# Patient Record
Sex: MALE | Race: WHITE | Employment: UNEMPLOYED | URBAN - METROPOLITAN AREA
[De-identification: names, ages, dates, MRNs, and addresses within clinical notes are randomized per-mention and may not be internally consistent; named-entity substitution may affect disease eponyms.]

---

## 2020-06-06 ENCOUNTER — APPOINTMENT (OUTPATIENT)
Dept: CT IMAGING | Age: 50
End: 2020-06-06
Payer: COMMERCIAL

## 2020-06-06 ENCOUNTER — HOSPITAL ENCOUNTER (OUTPATIENT)
Age: 50
Setting detail: OBSERVATION
Discharge: HOME | End: 2020-06-07
Attending: EMERGENCY MEDICINE | Admitting: HOSPITALIST
Payer: COMMERCIAL

## 2020-06-06 PROBLEM — R94.31 ABNORMAL EKG: Status: ACTIVE | Noted: 2020-06-06

## 2020-06-06 PROBLEM — R11.2 INTRACTABLE NAUSEA AND VOMITING: Status: ACTIVE | Noted: 2020-06-06

## 2020-06-06 PROBLEM — R10.9 ABDOMINAL PAIN: Status: ACTIVE | Noted: 2020-06-06

## 2020-06-06 LAB
ALBUMIN SERPL-MCNC: 4.4 G/DL (ref 3.5–5.2)
ALBUMIN SERPL-MCNC: 4.8 G/DL (ref 3.5–5.2)
ALP BLD-CCNC: 108 U/L (ref 40–129)
ALP BLD-CCNC: 118 U/L (ref 40–129)
ALT SERPL-CCNC: 14 U/L (ref 0–40)
ALT SERPL-CCNC: 16 U/L (ref 0–40)
AMPHETAMINE SCREEN, URINE: NOT DETECTED
ANION GAP SERPL CALCULATED.3IONS-SCNC: 18 MMOL/L (ref 7–16)
AST SERPL-CCNC: 15 U/L (ref 0–39)
AST SERPL-CCNC: 16 U/L (ref 0–39)
BACTERIA: ABNORMAL /HPF
BARBITURATE SCREEN URINE: NOT DETECTED
BASOPHILS ABSOLUTE: 0.03 E9/L (ref 0–0.2)
BASOPHILS RELATIVE PERCENT: 0.3 % (ref 0–2)
BENZODIAZEPINE SCREEN, URINE: NOT DETECTED
BILIRUB SERPL-MCNC: 0.6 MG/DL (ref 0–1.2)
BILIRUB SERPL-MCNC: 0.7 MG/DL (ref 0–1.2)
BILIRUBIN DIRECT: <0.2 MG/DL (ref 0–0.3)
BILIRUBIN URINE: ABNORMAL
BILIRUBIN, INDIRECT: NORMAL MG/DL (ref 0–1)
BLOOD, URINE: NEGATIVE
BUN BLDV-MCNC: 19 MG/DL (ref 6–20)
CALCIUM SERPL-MCNC: 10.1 MG/DL (ref 8.6–10.2)
CANNABINOID SCREEN URINE: NOT DETECTED
CHLORIDE BLD-SCNC: 94 MMOL/L (ref 98–107)
CLARITY: CLEAR
CO2: 27 MMOL/L (ref 22–29)
COCAINE METABOLITE SCREEN URINE: NOT DETECTED
COLOR: YELLOW
CREAT SERPL-MCNC: 1 MG/DL (ref 0.7–1.2)
EOSINOPHILS ABSOLUTE: 0.03 E9/L (ref 0.05–0.5)
EOSINOPHILS RELATIVE PERCENT: 0.3 % (ref 0–6)
EPITHELIAL CELLS, UA: ABNORMAL /HPF
ETHANOL: <10 MG/DL (ref 0–0.08)
FENTANYL SCREEN, URINE: NOT DETECTED
GFR AFRICAN AMERICAN: >60
GFR NON-AFRICAN AMERICAN: >60 ML/MIN/1.73
GLUCOSE BLD-MCNC: 137 MG/DL (ref 74–99)
GLUCOSE URINE: NEGATIVE MG/DL
HCT VFR BLD CALC: 44 % (ref 37–54)
HEMOGLOBIN: 15.2 G/DL (ref 12.5–16.5)
IMMATURE GRANULOCYTES #: 0.06 E9/L
IMMATURE GRANULOCYTES %: 0.6 % (ref 0–5)
KETONES, URINE: 40 MG/DL
LACTIC ACID: 1.2 MMOL/L (ref 0.5–2.2)
LEUKOCYTE ESTERASE, URINE: NEGATIVE
LIPASE: 14 U/L (ref 13–60)
LIPASE: 14 U/L (ref 13–60)
LYMPHOCYTES ABSOLUTE: 1.37 E9/L (ref 1.5–4)
LYMPHOCYTES RELATIVE PERCENT: 12.9 % (ref 20–42)
Lab: NORMAL
MCH RBC QN AUTO: 28.7 PG (ref 26–35)
MCHC RBC AUTO-ENTMCNC: 34.5 % (ref 32–34.5)
MCV RBC AUTO: 83 FL (ref 80–99.9)
METHADONE SCREEN, URINE: NOT DETECTED
MONOCYTES ABSOLUTE: 0.48 E9/L (ref 0.1–0.95)
MONOCYTES RELATIVE PERCENT: 4.5 % (ref 2–12)
NEUTROPHILS ABSOLUTE: 8.62 E9/L (ref 1.8–7.3)
NEUTROPHILS RELATIVE PERCENT: 81.4 % (ref 43–80)
NITRITE, URINE: NEGATIVE
OPIATE SCREEN URINE: NOT DETECTED
OXYCODONE URINE: NOT DETECTED
PDW BLD-RTO: 14.3 FL (ref 11.5–15)
PH UA: 8 (ref 5–9)
PHENCYCLIDINE SCREEN URINE: NOT DETECTED
PLATELET # BLD: 262 E9/L (ref 130–450)
PMV BLD AUTO: 10.6 FL (ref 7–12)
POTASSIUM SERPL-SCNC: 3.9 MMOL/L (ref 3.5–5)
PROTEIN UA: 30 MG/DL
RBC # BLD: 5.3 E12/L (ref 3.8–5.8)
RBC UA: ABNORMAL /HPF (ref 0–2)
SARS-COV-2, NAAT: ABNORMAL
SARS-COV-2, NAAT: NOT DETECTED
SODIUM BLD-SCNC: 139 MMOL/L (ref 132–146)
SPECIFIC GRAVITY UA: 1.01 (ref 1–1.03)
TOTAL PROTEIN: 7.4 G/DL (ref 6.4–8.3)
TOTAL PROTEIN: 7.7 G/DL (ref 6.4–8.3)
TROPONIN: <0.01 NG/ML (ref 0–0.03)
UROBILINOGEN, URINE: 0.2 E.U./DL
WBC # BLD: 10.6 E9/L (ref 4.5–11.5)
WBC UA: ABNORMAL /HPF (ref 0–5)

## 2020-06-06 PROCEDURE — 96376 TX/PRO/DX INJ SAME DRUG ADON: CPT

## 2020-06-06 PROCEDURE — 85025 COMPLETE CBC W/AUTO DIFF WBC: CPT

## 2020-06-06 PROCEDURE — 36415 COLL VENOUS BLD VENIPUNCTURE: CPT

## 2020-06-06 PROCEDURE — 6370000000 HC RX 637 (ALT 250 FOR IP): Performed by: INTERNAL MEDICINE

## 2020-06-06 PROCEDURE — G0378 HOSPITAL OBSERVATION PER HR: HCPCS

## 2020-06-06 PROCEDURE — 96372 THER/PROPH/DIAG INJ SC/IM: CPT

## 2020-06-06 PROCEDURE — 96374 THER/PROPH/DIAG INJ IV PUSH: CPT

## 2020-06-06 PROCEDURE — 81001 URINALYSIS AUTO W/SCOPE: CPT

## 2020-06-06 PROCEDURE — 83690 ASSAY OF LIPASE: CPT

## 2020-06-06 PROCEDURE — 96375 TX/PRO/DX INJ NEW DRUG ADDON: CPT

## 2020-06-06 PROCEDURE — 6360000002 HC RX W HCPCS: Performed by: HOSPITALIST

## 2020-06-06 PROCEDURE — 93005 ELECTROCARDIOGRAM TRACING: CPT | Performed by: INTERNAL MEDICINE

## 2020-06-06 PROCEDURE — 6360000002 HC RX W HCPCS: Performed by: EMERGENCY MEDICINE

## 2020-06-06 PROCEDURE — 80076 HEPATIC FUNCTION PANEL: CPT

## 2020-06-06 PROCEDURE — 84484 ASSAY OF TROPONIN QUANT: CPT

## 2020-06-06 PROCEDURE — 99285 EMERGENCY DEPT VISIT HI MDM: CPT

## 2020-06-06 PROCEDURE — 6360000004 HC RX CONTRAST MEDICATION: Performed by: RADIOLOGY

## 2020-06-06 PROCEDURE — 80053 COMPREHEN METABOLIC PANEL: CPT

## 2020-06-06 PROCEDURE — U0002 COVID-19 LAB TEST NON-CDC: HCPCS

## 2020-06-06 PROCEDURE — G0480 DRUG TEST DEF 1-7 CLASSES: HCPCS

## 2020-06-06 PROCEDURE — 80307 DRUG TEST PRSMV CHEM ANLYZR: CPT

## 2020-06-06 PROCEDURE — 96361 HYDRATE IV INFUSION ADD-ON: CPT

## 2020-06-06 PROCEDURE — 2580000003 HC RX 258: Performed by: HOSPITALIST

## 2020-06-06 PROCEDURE — 2580000003 HC RX 258: Performed by: EMERGENCY MEDICINE

## 2020-06-06 PROCEDURE — 83605 ASSAY OF LACTIC ACID: CPT

## 2020-06-06 PROCEDURE — 74177 CT ABD & PELVIS W/CONTRAST: CPT

## 2020-06-06 PROCEDURE — 93005 ELECTROCARDIOGRAM TRACING: CPT | Performed by: EMERGENCY MEDICINE

## 2020-06-06 RX ORDER — POTASSIUM CHLORIDE 20 MEQ/1
40 TABLET, EXTENDED RELEASE ORAL PRN
Status: DISCONTINUED | OUTPATIENT
Start: 2020-06-06 | End: 2020-06-07 | Stop reason: HOSPADM

## 2020-06-06 RX ORDER — TRAMADOL HYDROCHLORIDE 50 MG/1
100 TABLET ORAL EVERY 6 HOURS PRN
Status: DISCONTINUED | OUTPATIENT
Start: 2020-06-06 | End: 2020-06-07 | Stop reason: HOSPADM

## 2020-06-06 RX ORDER — FENTANYL CITRATE 50 UG/ML
50 INJECTION, SOLUTION INTRAMUSCULAR; INTRAVENOUS ONCE
Status: COMPLETED | OUTPATIENT
Start: 2020-06-06 | End: 2020-06-06

## 2020-06-06 RX ORDER — TRAMADOL HYDROCHLORIDE 50 MG/1
50 TABLET ORAL EVERY 6 HOURS PRN
Status: DISCONTINUED | OUTPATIENT
Start: 2020-06-06 | End: 2020-06-07 | Stop reason: HOSPADM

## 2020-06-06 RX ORDER — ACETAMINOPHEN 650 MG/1
650 SUPPOSITORY RECTAL EVERY 6 HOURS PRN
Status: DISCONTINUED | OUTPATIENT
Start: 2020-06-06 | End: 2020-06-07 | Stop reason: HOSPADM

## 2020-06-06 RX ORDER — PANTOPRAZOLE SODIUM 40 MG/1
40 TABLET, DELAYED RELEASE ORAL
Status: DISCONTINUED | OUTPATIENT
Start: 2020-06-06 | End: 2020-06-07 | Stop reason: HOSPADM

## 2020-06-06 RX ORDER — SODIUM CHLORIDE 9 MG/ML
INJECTION, SOLUTION INTRAVENOUS CONTINUOUS
Status: ACTIVE | OUTPATIENT
Start: 2020-06-06 | End: 2020-06-07

## 2020-06-06 RX ORDER — MORPHINE SULFATE 2 MG/ML
2 INJECTION, SOLUTION INTRAMUSCULAR; INTRAVENOUS EVERY 4 HOURS PRN
Status: DISCONTINUED | OUTPATIENT
Start: 2020-06-06 | End: 2020-06-07 | Stop reason: HOSPADM

## 2020-06-06 RX ORDER — ACETAMINOPHEN 325 MG/1
650 TABLET ORAL EVERY 6 HOURS PRN
Status: DISCONTINUED | OUTPATIENT
Start: 2020-06-06 | End: 2020-06-07 | Stop reason: HOSPADM

## 2020-06-06 RX ORDER — ONDANSETRON 2 MG/ML
4 INJECTION INTRAMUSCULAR; INTRAVENOUS EVERY 6 HOURS PRN
Status: DISCONTINUED | OUTPATIENT
Start: 2020-06-06 | End: 2020-06-07 | Stop reason: HOSPADM

## 2020-06-06 RX ORDER — MAGNESIUM SULFATE IN WATER 40 MG/ML
2 INJECTION, SOLUTION INTRAVENOUS PRN
Status: DISCONTINUED | OUTPATIENT
Start: 2020-06-06 | End: 2020-06-07 | Stop reason: HOSPADM

## 2020-06-06 RX ORDER — ONDANSETRON 2 MG/ML
4 INJECTION INTRAMUSCULAR; INTRAVENOUS ONCE
Status: COMPLETED | OUTPATIENT
Start: 2020-06-06 | End: 2020-06-06

## 2020-06-06 RX ORDER — SODIUM CHLORIDE 0.9 % (FLUSH) 0.9 %
10 SYRINGE (ML) INJECTION PRN
Status: DISCONTINUED | OUTPATIENT
Start: 2020-06-06 | End: 2020-06-07 | Stop reason: HOSPADM

## 2020-06-06 RX ORDER — POLYETHYLENE GLYCOL 3350 17 G/17G
17 POWDER, FOR SOLUTION ORAL DAILY PRN
Status: DISCONTINUED | OUTPATIENT
Start: 2020-06-06 | End: 2020-06-07 | Stop reason: HOSPADM

## 2020-06-06 RX ORDER — 0.9 % SODIUM CHLORIDE 0.9 %
1000 INTRAVENOUS SOLUTION INTRAVENOUS ONCE
Status: COMPLETED | OUTPATIENT
Start: 2020-06-06 | End: 2020-06-06

## 2020-06-06 RX ORDER — POTASSIUM CHLORIDE 7.45 MG/ML
10 INJECTION INTRAVENOUS PRN
Status: DISCONTINUED | OUTPATIENT
Start: 2020-06-06 | End: 2020-06-07 | Stop reason: HOSPADM

## 2020-06-06 RX ORDER — PROMETHAZINE HYDROCHLORIDE 25 MG/1
12.5 TABLET ORAL EVERY 6 HOURS PRN
Status: DISCONTINUED | OUTPATIENT
Start: 2020-06-06 | End: 2020-06-07 | Stop reason: HOSPADM

## 2020-06-06 RX ORDER — SODIUM CHLORIDE 0.9 % (FLUSH) 0.9 %
10 SYRINGE (ML) INJECTION EVERY 12 HOURS SCHEDULED
Status: DISCONTINUED | OUTPATIENT
Start: 2020-06-06 | End: 2020-06-07 | Stop reason: HOSPADM

## 2020-06-06 RX ADMIN — SODIUM CHLORIDE: 9 INJECTION, SOLUTION INTRAVENOUS at 08:10

## 2020-06-06 RX ADMIN — IOPAMIDOL 110 ML: 755 INJECTION, SOLUTION INTRAVENOUS at 04:16

## 2020-06-06 RX ADMIN — PANTOPRAZOLE SODIUM 40 MG: 40 TABLET, DELAYED RELEASE ORAL at 16:47

## 2020-06-06 RX ADMIN — TRAMADOL HYDROCHLORIDE 100 MG: 50 TABLET, FILM COATED ORAL at 21:27

## 2020-06-06 RX ADMIN — ONDANSETRON 4 MG: 2 INJECTION INTRAMUSCULAR; INTRAVENOUS at 15:07

## 2020-06-06 RX ADMIN — SODIUM CHLORIDE, PRESERVATIVE FREE 10 ML: 5 INJECTION INTRAVENOUS at 08:13

## 2020-06-06 RX ADMIN — PANTOPRAZOLE SODIUM 40 MG: 40 TABLET, DELAYED RELEASE ORAL at 08:16

## 2020-06-06 RX ADMIN — SODIUM CHLORIDE, PRESERVATIVE FREE 10 ML: 5 INJECTION INTRAVENOUS at 21:28

## 2020-06-06 RX ADMIN — ENOXAPARIN SODIUM 40 MG: 40 INJECTION SUBCUTANEOUS at 08:13

## 2020-06-06 RX ADMIN — FENTANYL CITRATE 50 MCG: 0.05 INJECTION, SOLUTION INTRAMUSCULAR; INTRAVENOUS at 02:30

## 2020-06-06 RX ADMIN — SODIUM CHLORIDE: 9 INJECTION, SOLUTION INTRAVENOUS at 16:47

## 2020-06-06 RX ADMIN — ONDANSETRON 4 MG: 2 INJECTION INTRAMUSCULAR; INTRAVENOUS at 21:27

## 2020-06-06 RX ADMIN — FENTANYL CITRATE 50 MCG: 0.05 INJECTION, SOLUTION INTRAMUSCULAR; INTRAVENOUS at 05:51

## 2020-06-06 RX ADMIN — ONDANSETRON 4 MG: 2 INJECTION INTRAMUSCULAR; INTRAVENOUS at 08:15

## 2020-06-06 RX ADMIN — SODIUM CHLORIDE 1000 ML: 9 INJECTION, SOLUTION INTRAVENOUS at 02:29

## 2020-06-06 RX ADMIN — ONDANSETRON HYDROCHLORIDE 4 MG: 2 SOLUTION INTRAMUSCULAR; INTRAVENOUS at 04:30

## 2020-06-06 RX ADMIN — TRAMADOL HYDROCHLORIDE 50 MG: 50 TABLET, FILM COATED ORAL at 15:06

## 2020-06-06 RX ADMIN — ONDANSETRON HYDROCHLORIDE 4 MG: 2 SOLUTION INTRAMUSCULAR; INTRAVENOUS at 02:30

## 2020-06-06 ASSESSMENT — PAIN - FUNCTIONAL ASSESSMENT: PAIN_FUNCTIONAL_ASSESSMENT: ACTIVITIES ARE NOT PREVENTED

## 2020-06-06 ASSESSMENT — PAIN DESCRIPTION - ORIENTATION
ORIENTATION: LEFT;LOWER
ORIENTATION: LEFT;LOWER

## 2020-06-06 ASSESSMENT — PAIN DESCRIPTION - DESCRIPTORS: DESCRIPTORS: STABBING

## 2020-06-06 ASSESSMENT — ENCOUNTER SYMPTOMS
SHORTNESS OF BREATH: 0
ABDOMINAL PAIN: 1
EYE REDNESS: 0
COUGH: 0
PHOTOPHOBIA: 0
CONSTIPATION: 0
VOMITING: 1
SORE THROAT: 0
FACIAL SWELLING: 0
NAUSEA: 1
DIARRHEA: 0
CHEST TIGHTNESS: 0
EYE PAIN: 0
ALLERGIC/IMMUNOLOGIC NEGATIVE: 1

## 2020-06-06 ASSESSMENT — PAIN SCALES - GENERAL
PAINLEVEL_OUTOF10: 9
PAINLEVEL_OUTOF10: 10
PAINLEVEL_OUTOF10: 5
PAINLEVEL_OUTOF10: 10
PAINLEVEL_OUTOF10: 10
PAINLEVEL_OUTOF10: 0
PAINLEVEL_OUTOF10: 4
PAINLEVEL_OUTOF10: 9
PAINLEVEL_OUTOF10: 7

## 2020-06-06 ASSESSMENT — PAIN DESCRIPTION - PAIN TYPE
TYPE: ACUTE PAIN
TYPE: ACUTE PAIN

## 2020-06-06 ASSESSMENT — PAIN DESCRIPTION - LOCATION
LOCATION: ABDOMEN
LOCATION: ABDOMEN

## 2020-06-06 ASSESSMENT — PAIN DESCRIPTION - ONSET: ONSET: ON-GOING

## 2020-06-06 ASSESSMENT — PAIN DESCRIPTION - PROGRESSION: CLINICAL_PROGRESSION: NOT CHANGED

## 2020-06-06 ASSESSMENT — PAIN DESCRIPTION - FREQUENCY: FREQUENCY: INTERMITTENT

## 2020-06-06 NOTE — ED PROVIDER NOTES
Patient is a 18-year-old incarcerated male presented the ED due to abdominal pain that began yesterday with proximately 10 episodes of emesis. Patient states that the abdominal pain is epigastric and left-sided in nature sharp is worse with lying flat and improves with sitting up. He states there is also emesis that is worse when he lies flat. Patient denies any past medical history. Denies any previous abdominal surgeries or any surgery of any kind. Patient denies fever does state he has had chills since starting the emesis. Symptoms are worsened by lying flat       Symptoms are improved by sitting up    Denies any associated loss of consciousness, headache, fever    Patient is tried Phenergan without improvement of emesis. Review of Systems   Constitutional: Positive for chills. Negative for fatigue and fever. HENT: Negative for congestion, facial swelling, hearing loss and sore throat. Eyes: Negative for photophobia, pain and redness. Respiratory: Negative for cough, chest tightness and shortness of breath. Cardiovascular: Negative for palpitations. Gastrointestinal: Positive for abdominal pain (Epigastric and left-sided), nausea and vomiting. Negative for constipation and diarrhea. Endocrine: Negative for cold intolerance, polydipsia and polyuria. Genitourinary: Negative for dysuria, flank pain and frequency. Musculoskeletal: Negative for arthralgias, joint swelling and myalgias. Skin: Negative. Allergic/Immunologic: Negative. Neurological: Negative. Physical Exam  Vitals signs and nursing note reviewed. Constitutional:       Appearance: He is well-developed. HENT:      Head: Normocephalic and atraumatic. Right Ear: Tympanic membrane normal.      Left Ear: Tympanic membrane normal.      Nose: Congestion present. Mouth/Throat:      Mouth: Mucous membranes are moist.      Pharynx: Oropharynx is clear.    Eyes:      Pupils: Pupils are equal, round, allergies.     -------------------------------------------------- RESULTS -------------------------------------------------    LABS:  Results for orders placed or performed during the hospital encounter of 06/06/20   CBC auto differential   Result Value Ref Range    WBC 10.6 4.5 - 11.5 E9/L    RBC 5.30 3.80 - 5.80 E12/L    Hemoglobin 15.2 12.5 - 16.5 g/dL    Hematocrit 44.0 37.0 - 54.0 %    MCV 83.0 80.0 - 99.9 fL    MCH 28.7 26.0 - 35.0 pg    MCHC 34.5 32.0 - 34.5 %    RDW 14.3 11.5 - 15.0 fL    Platelets 387 791 - 009 E9/L    MPV 10.6 7.0 - 12.0 fL    Neutrophils % 81.4 (H) 43.0 - 80.0 %    Immature Granulocytes % 0.6 0.0 - 5.0 %    Lymphocytes % 12.9 (L) 20.0 - 42.0 %    Monocytes % 4.5 2.0 - 12.0 %    Eosinophils % 0.3 0.0 - 6.0 %    Basophils % 0.3 0.0 - 2.0 %    Neutrophils Absolute 8.62 (H) 1.80 - 7.30 E9/L    Immature Granulocytes # 0.06 E9/L    Lymphocytes Absolute 1.37 (L) 1.50 - 4.00 E9/L    Monocytes Absolute 0.48 0.10 - 0.95 E9/L    Eosinophils Absolute 0.03 (L) 0.05 - 0.50 E9/L    Basophils Absolute 0.03 0.00 - 0.20 E9/L   Comprehensive Metabolic Panel   Result Value Ref Range    Sodium 139 132 - 146 mmol/L    Potassium 3.9 3.5 - 5.0 mmol/L    Chloride 94 (L) 98 - 107 mmol/L    CO2 27 22 - 29 mmol/L    Anion Gap 18 (H) 7 - 16 mmol/L    Glucose 137 (H) 74 - 99 mg/dL    BUN 19 6 - 20 mg/dL    CREATININE 1.0 0.7 - 1.2 mg/dL    GFR Non-African American >60 >=60 mL/min/1.73    GFR African American >60     Calcium 10.1 8.6 - 10.2 mg/dL    Total Protein 7.7 6.4 - 8.3 g/dL    Alb 4.8 3.5 - 5.2 g/dL    Total Bilirubin 0.7 0.0 - 1.2 mg/dL    Alkaline Phosphatase 118 40 - 129 U/L    ALT 16 0 - 40 U/L    AST 16 0 - 39 U/L   Lipase   Result Value Ref Range    Lipase 14 13 - 60 U/L   Lactic Acid, Plasma   Result Value Ref Range    Lactic Acid 1.2 0.5 - 2.2 mmol/L   Urinalysis   Result Value Ref Range    Color, UA Yellow Straw/Yellow    Clarity, UA Clear Clear    Glucose, Ur Negative Negative mg/dL    Bilirubin Urine SMALL (A) Negative    Ketones, Urine 40 (A) Negative mg/dL    Specific Gravity, UA 1.015 1.005 - 1.030    Blood, Urine Negative Negative    pH, UA 8.0 5.0 - 9.0    Protein, UA 30 (A) Negative mg/dL    Urobilinogen, Urine 0.2 <2.0 E.U./dL    Nitrite, Urine Negative Negative    Leukocyte Esterase, Urine Negative Negative   Troponin   Result Value Ref Range    Troponin <0.01 0.00 - 0.03 ng/mL   Microscopic Urinalysis   Result Value Ref Range    WBC, UA 1-3 0 - 5 /HPF    RBC, UA NONE 0 - 2 /HPF    Epithelial Cells, UA FEW /HPF    Bacteria, UA FEW (A) None Seen /HPF       RADIOLOGY:  CT ABDOMEN PELVIS W IV CONTRAST Additional Contrast? None   Final Result   No acute abdominal or pelvic abnormality. This report has been electronically signed by Aristides Roldan MD.          EKG: This EKG is signed and interpreted by me. Rate: 90  Rhythm: Sinus  Interpretation: T wave inversion in the inferior and lateral leads. Comparison: no previous EKG available      ------------------------- NURSING NOTES AND VITALS REVIEWED ---------------------------  Date / Time Roomed:  6/6/2020  2:12 AM  ED Bed Assignment:  23/23    The nursing notes within the ED encounter and vital signs as below have been reviewed. Patient Vitals for the past 24 hrs:   BP Temp Pulse Resp SpO2   06/06/20 0530 (!) 144/94 -- 93 16 96 %   06/06/20 0430 (!) 147/93 97.6 °F (36.4 °C) 97 16 95 %   06/06/20 0215 (!) 156/109 97.6 °F (36.4 °C) 100 18 94 %       Oxygen Saturation Interpretation: Normal    ------------------------------------------ PROGRESS NOTES ------------------------------------------  Re-evaluation(s):  Time: 0400  Patients symptoms show no change  Repeat physical examination is not changed    Counseling:  I have spoken with the patient and discussed todays results, in addition to providing specific details for the plan of care and counseling regarding the diagnosis and prognosis.   Their questions are answered at this time and they are abdomen pelvis showed no acute pathology. Patient medicated here. Patient did have episodes of emesis here as well. With patient still having abdominal pain as well as abnormal EKG was felt patient required observation. We did speak to on-call internal medicine patient will be admitted to monitored bed.          Yari Carbajal MD  06/06/20 401 [facility] [place]  06/06/20 [id number]

## 2020-06-07 VITALS
WEIGHT: 135 LBS | OXYGEN SATURATION: 97 % | RESPIRATION RATE: 16 BRPM | DIASTOLIC BLOOD PRESSURE: 89 MMHG | BODY MASS INDEX: 20.46 KG/M2 | HEART RATE: 74 BPM | SYSTOLIC BLOOD PRESSURE: 162 MMHG | HEIGHT: 68 IN | TEMPERATURE: 97.5 F

## 2020-06-07 LAB
ANION GAP SERPL CALCULATED.3IONS-SCNC: 8 MMOL/L (ref 7–16)
BASOPHILS ABSOLUTE: 0.05 E9/L (ref 0–0.2)
BASOPHILS RELATIVE PERCENT: 0.6 % (ref 0–2)
BUN BLDV-MCNC: 14 MG/DL (ref 6–20)
CALCIUM SERPL-MCNC: 9.1 MG/DL (ref 8.6–10.2)
CHLORIDE BLD-SCNC: 102 MMOL/L (ref 98–107)
CO2: 30 MMOL/L (ref 22–29)
CREAT SERPL-MCNC: 1 MG/DL (ref 0.7–1.2)
EKG ATRIAL RATE: 84 BPM
EKG ATRIAL RATE: 90 BPM
EKG P AXIS: 79 DEGREES
EKG P AXIS: 81 DEGREES
EKG P-R INTERVAL: 126 MS
EKG P-R INTERVAL: 146 MS
EKG Q-T INTERVAL: 284 MS
EKG Q-T INTERVAL: 370 MS
EKG QRS DURATION: 84 MS
EKG QRS DURATION: 84 MS
EKG QTC CALCULATION (BAZETT): 347 MS
EKG QTC CALCULATION (BAZETT): 437 MS
EKG R AXIS: 64 DEGREES
EKG R AXIS: 67 DEGREES
EKG T AXIS: -24 DEGREES
EKG T AXIS: 11 DEGREES
EKG VENTRICULAR RATE: 84 BPM
EKG VENTRICULAR RATE: 90 BPM
EOSINOPHILS ABSOLUTE: 0.24 E9/L (ref 0.05–0.5)
EOSINOPHILS RELATIVE PERCENT: 2.9 % (ref 0–6)
GFR AFRICAN AMERICAN: >60
GFR NON-AFRICAN AMERICAN: >60 ML/MIN/1.73
GLUCOSE BLD-MCNC: 99 MG/DL (ref 74–99)
HCT VFR BLD CALC: 38.4 % (ref 37–54)
HEMOGLOBIN: 12.9 G/DL (ref 12.5–16.5)
IMMATURE GRANULOCYTES #: 0.04 E9/L
IMMATURE GRANULOCYTES %: 0.5 % (ref 0–5)
LYMPHOCYTES ABSOLUTE: 2.26 E9/L (ref 1.5–4)
LYMPHOCYTES RELATIVE PERCENT: 27.3 % (ref 20–42)
MCH RBC QN AUTO: 29 PG (ref 26–35)
MCHC RBC AUTO-ENTMCNC: 33.6 % (ref 32–34.5)
MCV RBC AUTO: 86.3 FL (ref 80–99.9)
MONOCYTES ABSOLUTE: 0.79 E9/L (ref 0.1–0.95)
MONOCYTES RELATIVE PERCENT: 9.5 % (ref 2–12)
NEUTROPHILS ABSOLUTE: 4.9 E9/L (ref 1.8–7.3)
NEUTROPHILS RELATIVE PERCENT: 59.2 % (ref 43–80)
PDW BLD-RTO: 14.4 FL (ref 11.5–15)
PLATELET # BLD: 198 E9/L (ref 130–450)
PMV BLD AUTO: 10.5 FL (ref 7–12)
POTASSIUM REFLEX MAGNESIUM: 3.7 MMOL/L (ref 3.5–5)
RBC # BLD: 4.45 E12/L (ref 3.8–5.8)
SODIUM BLD-SCNC: 140 MMOL/L (ref 132–146)
WBC # BLD: 8.3 E9/L (ref 4.5–11.5)

## 2020-06-07 PROCEDURE — G0378 HOSPITAL OBSERVATION PER HR: HCPCS

## 2020-06-07 PROCEDURE — 2580000003 HC RX 258: Performed by: HOSPITALIST

## 2020-06-07 PROCEDURE — 6370000000 HC RX 637 (ALT 250 FOR IP): Performed by: INTERNAL MEDICINE

## 2020-06-07 PROCEDURE — 6360000002 HC RX W HCPCS: Performed by: HOSPITALIST

## 2020-06-07 PROCEDURE — 80048 BASIC METABOLIC PNL TOTAL CA: CPT

## 2020-06-07 PROCEDURE — 93010 ELECTROCARDIOGRAM REPORT: CPT | Performed by: INTERNAL MEDICINE

## 2020-06-07 PROCEDURE — 85025 COMPLETE CBC W/AUTO DIFF WBC: CPT

## 2020-06-07 PROCEDURE — 36415 COLL VENOUS BLD VENIPUNCTURE: CPT

## 2020-06-07 PROCEDURE — 96376 TX/PRO/DX INJ SAME DRUG ADON: CPT

## 2020-06-07 RX ORDER — AMLODIPINE BESYLATE 2.5 MG/1
2.5 TABLET ORAL DAILY
Status: DISCONTINUED | OUTPATIENT
Start: 2020-06-07 | End: 2020-06-07 | Stop reason: HOSPADM

## 2020-06-07 RX ORDER — ONDANSETRON 4 MG/1
4 TABLET, FILM COATED ORAL 3 TIMES DAILY PRN
Qty: 15 TABLET | Refills: 0 | Status: SHIPPED | OUTPATIENT
Start: 2020-06-07

## 2020-06-07 RX ORDER — ACETAMINOPHEN 500 MG
500 TABLET ORAL EVERY 6 HOURS PRN
Qty: 28 TABLET | Refills: 0 | Status: SHIPPED | OUTPATIENT
Start: 2020-06-07 | End: 2020-06-14

## 2020-06-07 RX ORDER — AMLODIPINE BESYLATE 2.5 MG/1
2.5 TABLET ORAL DAILY
Qty: 30 TABLET | Refills: 0 | Status: SHIPPED | OUTPATIENT
Start: 2020-06-07 | End: 2020-07-07

## 2020-06-07 RX ORDER — PANTOPRAZOLE SODIUM 40 MG/1
40 TABLET, DELAYED RELEASE ORAL DAILY
Qty: 30 TABLET | Refills: 0 | Status: SHIPPED | OUTPATIENT
Start: 2020-06-07 | End: 2020-07-07

## 2020-06-07 RX ADMIN — PANTOPRAZOLE SODIUM 40 MG: 40 TABLET, DELAYED RELEASE ORAL at 06:27

## 2020-06-07 RX ADMIN — AMLODIPINE BESYLATE 2.5 MG: 2.5 TABLET ORAL at 12:22

## 2020-06-07 RX ADMIN — TRAMADOL HYDROCHLORIDE 100 MG: 50 TABLET, FILM COATED ORAL at 05:54

## 2020-06-07 RX ADMIN — SODIUM CHLORIDE, PRESERVATIVE FREE 10 ML: 5 INJECTION INTRAVENOUS at 05:55

## 2020-06-07 RX ADMIN — ONDANSETRON 4 MG: 2 INJECTION INTRAMUSCULAR; INTRAVENOUS at 05:54

## 2020-06-07 ASSESSMENT — PAIN DESCRIPTION - ONSET: ONSET: PROGRESSIVE

## 2020-06-07 ASSESSMENT — PAIN DESCRIPTION - PROGRESSION: CLINICAL_PROGRESSION: NOT CHANGED

## 2020-06-07 ASSESSMENT — PAIN DESCRIPTION - FREQUENCY: FREQUENCY: INTERMITTENT

## 2020-06-07 ASSESSMENT — PAIN DESCRIPTION - PAIN TYPE: TYPE: ACUTE PAIN

## 2020-06-07 ASSESSMENT — PAIN SCALES - GENERAL
PAINLEVEL_OUTOF10: 5
PAINLEVEL_OUTOF10: 0
PAINLEVEL_OUTOF10: 10

## 2020-06-07 ASSESSMENT — PAIN DESCRIPTION - DESCRIPTORS: DESCRIPTORS: SHARP

## 2020-06-07 ASSESSMENT — PAIN DESCRIPTION - ORIENTATION: ORIENTATION: LOWER;LEFT

## 2020-06-07 ASSESSMENT — PAIN DESCRIPTION - LOCATION: LOCATION: ABDOMEN
